# Patient Record
Sex: FEMALE | Race: OTHER | Employment: OTHER | URBAN - METROPOLITAN AREA
[De-identification: names, ages, dates, MRNs, and addresses within clinical notes are randomized per-mention and may not be internally consistent; named-entity substitution may affect disease eponyms.]

---

## 2019-05-03 NOTE — PATIENT DISCUSSION
Biopsy Lesion Eyelid: The patient had a lesion on the left lower eyelid. After informed consent the patient received a local anesthetic injection of 1% lidocaine with epinephrine 1:100,000 units. A section of the mass was excised with Dangelo scissors and sent to the pathology laboratory for evaluation. The patient was given written post operative care instructions and a prescription for antibiotic ointment. The patient was asked to call our office within 10 days for follow up if the patient had not heard from our office regarding the result of the biopsy before that time.

## 2019-07-02 NOTE — PATIENT DISCUSSION
Also, please do not hesitate to call us if you have any concerns not addressed by this information. Please call [phone] and we will do everything we can to help you during this period.

## 2019-09-10 NOTE — PATIENT DISCUSSION
Second stage skin cancer repair. Patient had a history of skin cancer repair of the left lower eyelid. This was repaired by a Lizandro Zaratedict' tarsal-conjunctival flap with an adjacent tissue transfer of the lower eyelid. The soft tissue flap has been left in place to vascularize for several weeks. The purpose of this surgery today is to open the tarsal-conjunctival flap and improve the patients peripheral vision.

## 2020-09-10 NOTE — PATIENT DISCUSSION
Biopsy Lesion Eyelid: The patient had a lesion on the right eyelid. After informed consent the patient received a local anesthetic injection of 1% lidocaine with epinephrine 1:100,000 units. A section of the mass was excised with Dangelo scissors and sent to the pathology laboratory for evaluation. The patient was given written post operative care instructions and a prescription for antibiotic ointment. The patient was asked to call our office within 10 days for follow up if the patient had not heard from our office regarding the result of the biopsy before that time.

## 2021-07-06 ENCOUNTER — NEW PATIENT COMPREHENSIVE (OUTPATIENT)
Dept: URBAN - METROPOLITAN AREA CLINIC 44 | Facility: CLINIC | Age: 61
End: 2021-07-06

## 2021-07-06 DIAGNOSIS — H25.813: ICD-10-CM

## 2021-07-06 DIAGNOSIS — H52.7: ICD-10-CM

## 2021-07-06 PROCEDURE — 92015 DETERMINE REFRACTIVE STATE: CPT

## 2021-07-06 PROCEDURE — 92310-1 LEVEL 1 CONTACT LENS MANAGEMENT

## 2021-07-06 PROCEDURE — 92004 COMPRE OPH EXAM NEW PT 1/>: CPT

## 2021-07-06 ASSESSMENT — VISUAL ACUITY
OD_SC: 20/100
OD_CC: 20/20-2
OD_CC: J1
OS_BAT: 20/25
OS_CC: J3
OD_BAT: 20/25
OD_SC: >J12
OS_SC: 20/80-1
OS_CC: 20/25+2
OS_SC: >J12

## 2021-07-06 ASSESSMENT — TONOMETRY
OD_IOP_MMHG: 12
OS_IOP_MMHG: 10

## 2022-07-07 ENCOUNTER — ESTABLISHED PATIENT (OUTPATIENT)
Dept: URBAN - METROPOLITAN AREA CLINIC 47 | Facility: CLINIC | Age: 62
End: 2022-07-07

## 2022-07-07 DIAGNOSIS — H52.7: ICD-10-CM

## 2022-07-07 DIAGNOSIS — H25.813: ICD-10-CM

## 2022-07-07 PROCEDURE — 92014 COMPRE OPH EXAM EST PT 1/>: CPT

## 2022-07-07 PROCEDURE — 92015 DETERMINE REFRACTIVE STATE: CPT

## 2022-07-07 ASSESSMENT — VISUAL ACUITY
OS_CC: 20/25
OD_CC: 20/20
OS_CC: J2
OD_SC: 20/200
OD_CC: J1
OS_SC: 20/150
OD_SC: >J12
OS_SC: >J12

## 2022-07-07 ASSESSMENT — TONOMETRY
OD_IOP_MMHG: 10
OS_IOP_MMHG: 10

## 2023-07-11 ENCOUNTER — COMPREHENSIVE EXAM (OUTPATIENT)
Dept: URBAN - METROPOLITAN AREA CLINIC 47 | Facility: CLINIC | Age: 63
End: 2023-07-11

## 2023-07-11 DIAGNOSIS — H52.7: ICD-10-CM

## 2023-07-11 DIAGNOSIS — H25.813: ICD-10-CM

## 2023-07-11 PROCEDURE — 92015 DETERMINE REFRACTIVE STATE: CPT

## 2023-07-11 PROCEDURE — 92014 COMPRE OPH EXAM EST PT 1/>: CPT

## 2023-07-11 PROCEDURE — 92310-1 LEVEL 1 CONTACT LENS MANAGEMENT

## 2023-07-11 ASSESSMENT — VISUAL ACUITY
OS_CC: J10
OS_CC: 20/20-1
OU_CC: 20/20
OS_BAT: 20/30
OU_CC: J6
OD_BAT: 20/30
OD_CC: J3
OD_CC: 20/20

## 2023-07-11 ASSESSMENT — TONOMETRY
OD_IOP_MMHG: 16
OS_IOP_MMHG: 15

## 2024-07-19 ENCOUNTER — COMPREHENSIVE EXAM (OUTPATIENT)
Dept: URBAN - METROPOLITAN AREA CLINIC 47 | Facility: CLINIC | Age: 64
End: 2024-07-19

## 2024-07-19 DIAGNOSIS — H25.813: ICD-10-CM

## 2024-07-19 DIAGNOSIS — H52.7: ICD-10-CM

## 2024-07-19 PROCEDURE — 92014 COMPRE OPH EXAM EST PT 1/>: CPT

## 2024-07-19 PROCEDURE — 92015 DETERMINE REFRACTIVE STATE: CPT

## 2024-07-19 ASSESSMENT — VISUAL ACUITY
OD_SC: <J12
OS_CC: J3
OS_SC: J12
OU_SC: J10
OS_BAT: 20/50
OU_CC: J2
OD_SC: 20/100-1
OD_BAT: 20/40
OD_CC: J4
OU_SC: 20/100+2
OD_CC: 20/30-1
OS_CC: 20/30
OU_CC: 20/25
OS_SC: 20/150+1

## 2024-07-19 ASSESSMENT — TONOMETRY
OD_IOP_MMHG: 13
OS_IOP_MMHG: 13

## 2025-07-14 ENCOUNTER — COMPREHENSIVE EXAM (OUTPATIENT)
Age: 65
End: 2025-07-14

## 2025-07-14 DIAGNOSIS — H25.813: ICD-10-CM

## 2025-07-14 PROCEDURE — 92014 COMPRE OPH EXAM EST PT 1/>: CPT
